# Patient Record
Sex: FEMALE | Race: WHITE | NOT HISPANIC OR LATINO | URBAN - METROPOLITAN AREA
[De-identification: names, ages, dates, MRNs, and addresses within clinical notes are randomized per-mention and may not be internally consistent; named-entity substitution may affect disease eponyms.]

---

## 2023-02-26 ENCOUNTER — EMERGENCY (EMERGENCY)
Facility: HOSPITAL | Age: 23
LOS: 1 days | Discharge: HOME | End: 2023-02-26
Attending: EMERGENCY MEDICINE | Admitting: EMERGENCY MEDICINE
Payer: COMMERCIAL

## 2023-02-26 VITALS
HEART RATE: 79 BPM | OXYGEN SATURATION: 100 % | TEMPERATURE: 101 F | RESPIRATION RATE: 18 BRPM | SYSTOLIC BLOOD PRESSURE: 100 MMHG | DIASTOLIC BLOOD PRESSURE: 65 MMHG

## 2023-02-26 VITALS — TEMPERATURE: 100 F

## 2023-02-26 LAB
ALBUMIN SERPL ELPH-MCNC: 3.9 G/DL — SIGNIFICANT CHANGE UP (ref 3.3–5)
ALP SERPL-CCNC: 39 U/L — LOW (ref 40–120)
ALT FLD-CCNC: 7 U/L — SIGNIFICANT CHANGE UP (ref 4–33)
ANION GAP SERPL CALC-SCNC: 8 MMOL/L — SIGNIFICANT CHANGE UP (ref 7–14)
APPEARANCE UR: ABNORMAL
AST SERPL-CCNC: 11 U/L — SIGNIFICANT CHANGE UP (ref 4–32)
BACTERIA # UR AUTO: ABNORMAL
BASOPHILS # BLD AUTO: 0.03 K/UL — SIGNIFICANT CHANGE UP (ref 0–0.2)
BASOPHILS NFR BLD AUTO: 0.2 % — SIGNIFICANT CHANGE UP (ref 0–2)
BILIRUB SERPL-MCNC: 0.5 MG/DL — SIGNIFICANT CHANGE UP (ref 0.2–1.2)
BILIRUB UR-MCNC: NEGATIVE — SIGNIFICANT CHANGE UP
BUN SERPL-MCNC: 8 MG/DL — SIGNIFICANT CHANGE UP (ref 7–23)
CALCIUM SERPL-MCNC: 8.1 MG/DL — LOW (ref 8.4–10.5)
CHLORIDE SERPL-SCNC: 109 MMOL/L — HIGH (ref 98–107)
CO2 SERPL-SCNC: 20 MMOL/L — LOW (ref 22–31)
COLOR SPEC: SIGNIFICANT CHANGE UP
CREAT SERPL-MCNC: 0.65 MG/DL — SIGNIFICANT CHANGE UP (ref 0.5–1.3)
DIFF PNL FLD: ABNORMAL
EGFR: 128 ML/MIN/1.73M2 — SIGNIFICANT CHANGE UP
EOSINOPHIL # BLD AUTO: 0.01 K/UL — SIGNIFICANT CHANGE UP (ref 0–0.5)
EOSINOPHIL NFR BLD AUTO: 0.1 % — SIGNIFICANT CHANGE UP (ref 0–6)
FLUAV AG NPH QL: SIGNIFICANT CHANGE UP
FLUBV AG NPH QL: SIGNIFICANT CHANGE UP
GLUCOSE SERPL-MCNC: 78 MG/DL — SIGNIFICANT CHANGE UP (ref 70–99)
GLUCOSE UR QL: NEGATIVE — SIGNIFICANT CHANGE UP
HCG SERPL-ACNC: <5 MIU/ML — SIGNIFICANT CHANGE UP
HCT VFR BLD CALC: 35.1 % — SIGNIFICANT CHANGE UP (ref 34.5–45)
HGB BLD-MCNC: 11.5 G/DL — SIGNIFICANT CHANGE UP (ref 11.5–15.5)
IANC: 11.65 K/UL — HIGH (ref 1.8–7.4)
IMM GRANULOCYTES NFR BLD AUTO: 0.5 % — SIGNIFICANT CHANGE UP (ref 0–0.9)
KETONES UR-MCNC: ABNORMAL
LEUKOCYTE ESTERASE UR-ACNC: ABNORMAL
LYMPHOCYTES # BLD AUTO: 0.91 K/UL — LOW (ref 1–3.3)
LYMPHOCYTES # BLD AUTO: 6.8 % — LOW (ref 13–44)
MCHC RBC-ENTMCNC: 28.8 PG — SIGNIFICANT CHANGE UP (ref 27–34)
MCHC RBC-ENTMCNC: 32.8 GM/DL — SIGNIFICANT CHANGE UP (ref 32–36)
MCV RBC AUTO: 87.8 FL — SIGNIFICANT CHANGE UP (ref 80–100)
MONOCYTES # BLD AUTO: 0.7 K/UL — SIGNIFICANT CHANGE UP (ref 0–0.9)
MONOCYTES NFR BLD AUTO: 5.2 % — SIGNIFICANT CHANGE UP (ref 2–14)
NEUTROPHILS # BLD AUTO: 11.65 K/UL — HIGH (ref 1.8–7.4)
NEUTROPHILS NFR BLD AUTO: 87.2 % — HIGH (ref 43–77)
NITRITE UR-MCNC: NEGATIVE — SIGNIFICANT CHANGE UP
NRBC # BLD: 0 /100 WBCS — SIGNIFICANT CHANGE UP (ref 0–0)
NRBC # FLD: 0 K/UL — SIGNIFICANT CHANGE UP (ref 0–0)
PH UR: 6.5 — SIGNIFICANT CHANGE UP (ref 5–8)
PLATELET # BLD AUTO: 186 K/UL — SIGNIFICANT CHANGE UP (ref 150–400)
POTASSIUM SERPL-MCNC: 3.1 MMOL/L — LOW (ref 3.5–5.3)
POTASSIUM SERPL-SCNC: 3.1 MMOL/L — LOW (ref 3.5–5.3)
PROT SERPL-MCNC: 6.1 G/DL — SIGNIFICANT CHANGE UP (ref 6–8.3)
PROT UR-MCNC: ABNORMAL
RBC # BLD: 4 M/UL — SIGNIFICANT CHANGE UP (ref 3.8–5.2)
RBC # FLD: 12 % — SIGNIFICANT CHANGE UP (ref 10.3–14.5)
RBC CASTS # UR COMP ASSIST: 10 /HPF — HIGH (ref 0–4)
RSV RNA NPH QL NAA+NON-PROBE: SIGNIFICANT CHANGE UP
SARS-COV-2 RNA SPEC QL NAA+PROBE: SIGNIFICANT CHANGE UP
SODIUM SERPL-SCNC: 137 MMOL/L — SIGNIFICANT CHANGE UP (ref 135–145)
SP GR SPEC: 1.01 — SIGNIFICANT CHANGE UP (ref 1.01–1.05)
UROBILINOGEN FLD QL: SIGNIFICANT CHANGE UP
WBC # BLD: 13.37 K/UL — HIGH (ref 3.8–10.5)
WBC # FLD AUTO: 13.37 K/UL — HIGH (ref 3.8–10.5)
WBC UR QL: 15 /HPF — HIGH (ref 0–5)

## 2023-02-26 PROCEDURE — 99284 EMERGENCY DEPT VISIT MOD MDM: CPT

## 2023-02-26 RX ORDER — POTASSIUM CHLORIDE 20 MEQ
40 PACKET (EA) ORAL ONCE
Refills: 0 | Status: COMPLETED | OUTPATIENT
Start: 2023-02-26 | End: 2023-02-26

## 2023-02-26 RX ORDER — CEFTRIAXONE 500 MG/1
1000 INJECTION, POWDER, FOR SOLUTION INTRAMUSCULAR; INTRAVENOUS ONCE
Refills: 0 | Status: COMPLETED | OUTPATIENT
Start: 2023-02-26 | End: 2023-02-26

## 2023-02-26 RX ORDER — CEFPODOXIME PROXETIL 100 MG
1 TABLET ORAL
Qty: 20 | Refills: 0
Start: 2023-02-26 | End: 2023-03-07

## 2023-02-26 RX ORDER — ACETAMINOPHEN 500 MG
650 TABLET ORAL ONCE
Refills: 0 | Status: COMPLETED | OUTPATIENT
Start: 2023-02-26 | End: 2023-02-26

## 2023-02-26 RX ORDER — SODIUM CHLORIDE 9 MG/ML
1000 INJECTION INTRAMUSCULAR; INTRAVENOUS; SUBCUTANEOUS ONCE
Refills: 0 | Status: COMPLETED | OUTPATIENT
Start: 2023-02-26 | End: 2023-02-26

## 2023-02-26 RX ADMIN — Medication 650 MILLIGRAM(S): at 16:05

## 2023-02-26 RX ADMIN — SODIUM CHLORIDE 1000 MILLILITER(S): 9 INJECTION INTRAMUSCULAR; INTRAVENOUS; SUBCUTANEOUS at 17:49

## 2023-02-26 RX ADMIN — SODIUM CHLORIDE 1000 MILLILITER(S): 9 INJECTION INTRAMUSCULAR; INTRAVENOUS; SUBCUTANEOUS at 16:05

## 2023-02-26 RX ADMIN — Medication 40 MILLIEQUIVALENT(S): at 17:49

## 2023-02-26 RX ADMIN — Medication 650 MILLIGRAM(S): at 16:35

## 2023-02-26 RX ADMIN — CEFTRIAXONE 100 MILLIGRAM(S): 500 INJECTION, POWDER, FOR SOLUTION INTRAMUSCULAR; INTRAVENOUS at 18:28

## 2023-02-26 NOTE — ED PROVIDER NOTE - CLINICAL SUMMARY MEDICAL DECISION MAKING FREE TEXT BOX
23 yo F no pmhx p/w syncopal episode. Febrile 100.9 orally other vital signs stable.  Benign neuro exam back to baseline.  Concern for vasovagal syncope versus dehydration likely secondary to viral process.  Lower concern for cardiac syncope electrolyte abnormality other infectious etiology.  Plan Labs EKG IVF   meds and reassess.

## 2023-02-26 NOTE — ED PROVIDER NOTE - PROGRESS NOTE DETAILS
Helder KERNS PGY2: pt with uti given abx and rx. other labs and imaging negative for emergent pathology at this time. pt sx improved. safe for dc with strict return precautions and pcp f/u

## 2023-02-26 NOTE — ED PROVIDER NOTE - OBJECTIVE STATEMENT
21 yo F no past medical history presents with episode of syncope.   Patient reports having body aches chills nasal congestion sore throat over the last 2 days.  Sick contacts at home with similar symptoms last week.  Patient was in bathroom and felt very warm then cold and passed out.  Patient's family went into the bathroom caught her before she could fall says she was out for about 30 seconds to 1 minute.  Was back to baseline in several minutes.   Able to ambulate. No tongue biting no incontinence.  No changes in sensation weakness headache changes in vision.  No chest pain or shortness of breath prior to episode.  No history of similar episodes.  No new medications.

## 2023-02-26 NOTE — ED PROVIDER NOTE - ATTENDING CONTRIBUTION TO CARE
Patient is a 22-year-old female with no chronic medical problems here for evaluation of syncope.  Patient reports she started to feel unwell yesterday.  She reports her younger sister is also sick.  She complains of having body aches, chills, nasal congestion and a sore throat.  She denies any coughing.  She denies any rashes.  She states that she went to the bathroom and after getting up she felt very weak and "like all of her energy was drained."  She states that she felt like she was going to fall and screamed out.  Her mother was able to come to the bathroom and prevented her from hitting her head.  Mother states patient was passed out for about 30 seconds to 1 minute.  Patient states she recalls her mother calling paramedics and heard her on the phone.  She states she felt like she had no energy.  Patient did not eat or drink anything today.  Patient denies any chest pain, shortness of breath, abdominal pain, urinary symptoms, diarrhea, black or bloody stools.    VS noted - febrile to 100.9  Gen. no acute distress, Non toxic   HEENT: EOMI, mmm, pharynx normal, no nuchal rigidity, supple neck  Lungs: CTAB/L no C/ W /R   CVS: RRR   Abd; Soft non tender, non distended, no CVA tenderness bilaterally  Ext: no edema  Skin: no rash  Neuro AAOx3 non focal clear speech  a/p: fever, myalgias - patient did not eat or drink today. EKG reviewed and has a NSR. Plan for labs to check for dehydration, metabolic abnormality, u/a, flu/ covid. Will give IVF, tylenol and reassess.   - Sea KERNS Patient is a 22-year-old female with no chronic medical problems here for evaluation of syncope.  Patient reports she started to feel unwell yesterday.  She reports her younger sister is also sick.  She complains of having body aches, chills, nasal congestion and a sore throat.  She denies any coughing.  She denies any rashes.  She states that she went to the bathroom and after getting up she felt very weak and "like all of her energy was drained."  She states that she felt like she was going to fall and screamed out.  Her mother was able to come to the bathroom and prevented her from hitting her head.  Mother states patient was passed out for about 30 seconds to 1 minute.  Patient states she recalls her mother calling paramedics and heard her on the phone.  She states she felt like she had no energy.  Patient did not eat or drink anything today.  Patient denies any chest pain, shortness of breath, abdominal pain, urinary symptoms, diarrhea, black or bloody stools. No tongue biting.     VS noted - febrile to 100.9  Gen. no acute distress, Non toxic   HEENT: EOMI, mmm, pharynx normal, no nuchal rigidity, supple neck  Lungs: CTAB/L no C/ W /R   CVS: RRR   Abd; Soft non tender, non distended, no CVA tenderness bilaterally  Ext: no edema  Skin: no rash  Neuro AAOx3 non focal clear speech  a/p: fever, myalgias - patient did not eat or drink today. EKG reviewed and has a NSR. Plan for labs to check for dehydration, metabolic abnormality, u/a, flu/ covid. Will give IVF, tylenol and reassess.   - Sea KERNS

## 2023-02-26 NOTE — ED ADULT NURSE REASSESSMENT NOTE - NS ED NURSE REASSESS COMMENT FT1
Patient to results waiting with no c/o pain. Pt waiting for dispo and reevaluation by MD.  DIAN Murrieta

## 2023-02-26 NOTE — ED ADULT TRIAGE NOTE - CHIEF COMPLAINT QUOTE
c/o passing out while on a toilet today, reports feeling weak, having body aches for the past 2 days, denies cough, fever, chills., PMHx.

## 2023-02-26 NOTE — ED ADULT NURSE NOTE - OBJECTIVE STATEMENT
Pt received in intake room 3, accompanied by family member, LUKE&Ox4, ambulatory at baseline with no pertinent PMH coming to the ED by EMS for complaints of passing out after using the toilet. Pt states that she "got up from the toilet and started walking when she passed out". As per family member pt LOC for 30 seconds. Pt denies head strike, fall (mother caught her). Pt endorses weakness and body aches at this time. Pt endorses that family had similar symptoms approximately one week ago. Pt states to have felt dizzy and had mild chest discomfort when she fell. Pt currently denies chest pain, SOB, N/V/D, abdominal pain, dizziness, pain at this time. RR equal and unlabored. Pt febrile in triage. 18G IV placed by ems in the R forearm. Labs drawn and sent. Medicated as per ordered. Safety maintained. Care plan continued. Comfort measures provided.

## 2023-02-26 NOTE — ED PROVIDER NOTE - PATIENT PORTAL LINK FT
You can access the FollowMyHealth Patient Portal offered by [facility] by registering at the following website: [email or web address]. By joining [facility]’s FollowMyHealth portal, you will also be able to view your health information using other applications (apps) compatible with our system.

## 2023-02-26 NOTE — ED PROVIDER NOTE - PHYSICAL EXAMINATION
Gen: NAD, non-toxic appearing  Head: normal appearing  HEENT: normal conjunctiva, oral mucosa moist  Lung: no respiratory distress, speaking in full sentences, CTA b/l     CV: regular rate and rhythm, no murmurs  Abd: soft, non distended, non tender   MSK: no visible deformities  Neuro: CN2-12 grossly intact, A&Ox4, MS +5/5 in UE and LE BL, gross sensation intact in UE and LE BL, gait smooth and coordinated,   Skin: Warm  Psych: normal affect

## 2023-02-27 LAB
CULTURE RESULTS: SIGNIFICANT CHANGE UP
SPECIMEN SOURCE: SIGNIFICANT CHANGE UP

## 2023-03-12 NOTE — ED POST DISCHARGE NOTE - REASON FOR FOLLOW-UP
Patient called for results. Discussed with patient Infuenza A and B , RSV and COVID-19 all negative. Also discussed with patient UCX results. Other

## 2023-05-16 NOTE — ED PROVIDER NOTE - EKG ADDITIONAL QUESTION - PERFORMED INDEPENDENT VISUALIZATION
Yes
FAMILY HISTORY:  Mother  Still living? Yes, Estimated age: 81-90  Family history of hypertension, Age at diagnosis: Age Unknown